# Patient Record
Sex: MALE | Race: WHITE | NOT HISPANIC OR LATINO | Employment: UNEMPLOYED | URBAN - METROPOLITAN AREA
[De-identification: names, ages, dates, MRNs, and addresses within clinical notes are randomized per-mention and may not be internally consistent; named-entity substitution may affect disease eponyms.]

---

## 2017-05-14 ENCOUNTER — OFFICE VISIT (OUTPATIENT)
Dept: URGENT CARE | Facility: URGENT CARE | Age: 27
End: 2017-05-14
Payer: COMMERCIAL

## 2017-05-14 VITALS
HEART RATE: 63 BPM | SYSTOLIC BLOOD PRESSURE: 116 MMHG | RESPIRATION RATE: 16 BRPM | DIASTOLIC BLOOD PRESSURE: 60 MMHG | BODY MASS INDEX: 19.42 KG/M2 | WEIGHT: 142.2 LBS | OXYGEN SATURATION: 98 %

## 2017-05-14 DIAGNOSIS — J45.20 ASTHMA, INTERMITTENT, UNCOMPLICATED: Primary | ICD-10-CM

## 2017-05-14 PROCEDURE — 99213 OFFICE O/P EST LOW 20 MIN: CPT

## 2017-05-14 RX ORDER — ALBUTEROL SULFATE 90 UG/1
2 AEROSOL, METERED RESPIRATORY (INHALATION) EVERY 6 HOURS PRN
Qty: 1 INHALER | Refills: 0 | Status: SHIPPED | OUTPATIENT
Start: 2017-05-14 | End: 2017-07-06

## 2017-05-14 NOTE — MR AVS SNAPSHOT
"              After Visit Summary   2017    Bryson Graves II    MRN: [id number]           Patient Information     Date Of Birth          1990        Visit Information        Provider Department      2017 12:15 PM [place]  PROVIDER [facility]        Today's Diagnoses     Asthma, intermittent, uncomplicated    -  1       Follow-ups after your visit        Who to contact     If you have questions or need follow up information about today's clinic visit or your schedule please contact [facility] directly at [phone].  Normal or non-critical lab and imaging results will be communicated to you by [employer], letter or phone within 4 business days after the clinic has received the results. If you do not hear from us within 7 days, please contact the clinic through [employer] or phone. If you have a critical or abnormal lab result, we will notify you by phone as soon as possible.  Submit refill requests through [employer] or call your pharmacy and they will forward the refill request to us. Please allow 3 business days for your refill to be completed.          Additional Information About Your Visit        MyChart Information     [employer] lets you send messages to your doctor, view your test results, renew your prescriptions, schedule appointments and more. To sign up, go to [email or web address] . Click on \"Log in\" on the left side of the screen, which will take you to the Welcome page. Then click on \"Sign up Now\" on the right side of the page.     You will be asked to enter the access code listed below, as well as some personal information. Please follow the directions to create your username and password.     Your access code is: [email or web address]  Expires: 2017 12:59 PM     Your access code will  in 90 days. If you need help or a new code, please call your [facility] clinic or [phone].        Care EveryWhere ID     This is your Care EveryWhere ID. This " could be used by other organizations to access your [facility] medical records  [id number]        Your Vitals Were     Pulse Respirations Pulse Oximetry BMI (Body Mass Index)          63 16 98% 19.42 kg/m2         Blood Pressure from Last 3 Encounters:   05/14/17 116/60   12/22/16 118/80   09/19/16 118/70    Weight from Last 3 Encounters:   05/14/17 142 lb 3.2 oz (64.5 kg)   12/22/16 147 lb (66.7 kg)   09/19/16 157 lb (71.2 kg)              Today, you had the following     No orders found for display         Today's Medication Changes          These changes are accurate as of: 5/14/17 12:59 PM.  If you have any questions, ask your nurse or doctor.               These medicines have changed or have updated prescriptions.        Dose/Directions    * albuterol 108 (90 BASE) MCG/ACT Inhaler   Commonly known as:  PROAIR HFA/PROVENTIL HFA/VENTOLIN HFA   This may have changed:  Another medication with the same name was added. Make sure you understand how and when to take each.   Changed by:  Naeem Ann MD        Dose:  2 puff   Inhale 2 puffs into the lungs every 6 hours   Refills:  0       * albuterol 108 (90 BASE) MCG/ACT Inhaler   Commonly known as:  PROAIR HFA/PROVENTIL HFA/VENTOLIN HFA   This may have changed:  You were already taking a medication with the same name, and this prescription was added. Make sure you understand how and when to take each.   Used for:  Asthma, intermittent, uncomplicated        Dose:  2 puff   Inhale 2 puffs into the lungs every 6 hours as needed for shortness of breath / dyspnea or wheezing   Quantity:  1 Inhaler   Refills:  0       * Notice:  This list has 2 medication(s) that are the same as other medications prescribed for you. Read the directions carefully, and ask your doctor or other care provider to review them with you.         Where to get your medicines      These [facility] [address] [facility] [place] [facility] [address]  [address]  [place] [address]     Phone:  [phone]     albuterol 108 (90 BASE) MCG/ACT Inhaler                Primary Care Provider Office Phone #    [facility] [phone]       [address] [place] [address]        Thank you!     Thank you for choosing [facility]  for your care. Our goal is always to provide you with excellent care. Hearing back from our patients is one way we can continue to improve our services. Please take a few minutes to complete the written survey that you may receive in the mail after your visit with us. Thank you!             Your Updated Medication List - Protect others around you: Learn how to safely use, store and throw away your medicines at www.disposemymeds.org.          This list is accurate as of: 5/14/17 12:59 PM.  Always use your most recent med list.                   Brand Name Dispense Instructions for use    * albuterol 108 (90 BASE) MCG/ACT Inhaler    PROAIR HFA/PROVENTIL HFA/VENTOLIN HFA     Inhale 2 puffs into the lungs every 6 hours       * albuterol 108 (90 BASE) MCG/ACT Inhaler    PROAIR HFA/PROVENTIL HFA/VENTOLIN HFA    1 Inhaler    Inhale 2 puffs into the lungs every 6 hours as needed for shortness of breath / dyspnea or wheezing       * Notice:  This list has 2 medication(s) that are the same as other medications prescribed for you. Read the directions carefully, and ask your doctor or other care provider to review them with you.

## 2017-05-14 NOTE — PROGRESS NOTES
SUBJECTIVE:    Bryson Graves II is a 26 y.o. Man who present to  today asking for refill on his Albuterol Inhaler.  Patient states he ran out of Albuterol ~ 3 days ago. Denies any acute exacerbation sxs or acute URI sxs.     Patient states he has long-standing, intermittent, asthma that requires PRN use of Albuterol (typically 1-2x/week at maximum). No night-time asthma sxs. No ER visits or IP Hosp tx for asthma in lifetime.     I did personally complete an ACT evaluation with him today (score 23) on file in Epic.        OBJECTIVE:  /60 (BP Location: Right arm, Patient Position: Chair, Cuff Size: Adult Regular)  Pulse 63  Resp 16  Wt 142 lb 3.2 oz (64.5 kg)  SpO2 98%  BMI 19.42 kg/m2      General appearance: alert and no apparent distress  Skin color is pink and without rash.  HEENT:   Conjunctiva not injected.  Sclera clear.  Left TM is normal: no effusions, no erythema, and normal landmarks.  Right TM is normal: no effusions, no erythema, and normal landmarks.  Nasal mucosa is normal.  Oropharyngeal exam is normal: no lesions, erythema, adenopathy or exudate.  Neck is supple with no adenopathy  CARDIAC:NORMAL - regular rate and rhythm without murmur.  RESP: Normal - CTA without rales, rhonchi, or wheezing.      ASSESSMENT/PLAN:    (J45.20) Asthma, intermittent, uncomplicated  (primary encounter diagnosis)  Plan: albuterol (PROAIR HFA/PROVENTIL HFA/VENTOLIN         HFA) 108 (90 BASE) MCG/ACT Inhaler    Patient educated we do not manage chronic conditions in . He is  educated he will need to follow-up with PCP for refills and for all future asthma management. He verbalizes understanding of and agrees to the above plan.

## 2017-05-14 NOTE — NURSING NOTE
"Chief Complaint   Patient presents with     Urgent Care     Refill Request     pt requesting refill on albuterol inhaler.  pt reports slight difficult in breathing today.       Initial /60 (BP Location: Right arm, Patient Position: Chair, Cuff Size: Adult Regular)  Pulse 63  Resp 16  Wt 142 lb 3.2 oz (64.5 kg)  SpO2 98%  BMI 19.42 kg/m2 Estimated body mass index is 19.42 kg/(m^2) as calculated from the following:    Height as of 12/22/16: 5' 11.75\" (1.822 m).    Weight as of this encounter: 142 lb 3.2 oz (64.5 kg).  Medication Reconciliation: complete      Ruth Ann Gonzalez CMA                                5/14/2017 12:38 PM     "

## 2017-05-15 ASSESSMENT — ASTHMA QUESTIONNAIRES: ACT_TOTALSCORE: 23

## 2017-07-06 ENCOUNTER — OFFICE VISIT (OUTPATIENT)
Dept: URGENT CARE | Facility: URGENT CARE | Age: 27
End: 2017-07-06
Payer: COMMERCIAL

## 2017-07-06 VITALS
DIASTOLIC BLOOD PRESSURE: 70 MMHG | OXYGEN SATURATION: 100 % | BODY MASS INDEX: 19.39 KG/M2 | SYSTOLIC BLOOD PRESSURE: 116 MMHG | HEART RATE: 56 BPM | RESPIRATION RATE: 16 BRPM | WEIGHT: 142 LBS | TEMPERATURE: 97.3 F

## 2017-07-06 DIAGNOSIS — Z91.010 PEANUT ALLERGY: Primary | ICD-10-CM

## 2017-07-06 DIAGNOSIS — T78.40XA ALLERGIC REACTION, INITIAL ENCOUNTER: ICD-10-CM

## 2017-07-06 PROCEDURE — 99213 OFFICE O/P EST LOW 20 MIN: CPT | Performed by: PHYSICIAN ASSISTANT

## 2017-07-06 RX ORDER — EPINEPHRINE 0.3 MG/.3ML
0.3 INJECTION SUBCUTANEOUS
Qty: 0.6 ML | Refills: 0 | Status: SHIPPED | OUTPATIENT
Start: 2017-07-06

## 2017-07-06 RX ORDER — DIPHENHYDRAMINE HCL 25 MG
50 TABLET ORAL EVERY 6 HOURS PRN
Qty: 2 TABLET | Refills: 0
Start: 2017-07-06 | End: 2017-07-07

## 2017-07-06 RX ORDER — EPINEPHRINE 0.3 MG/.3ML
0.3 INJECTION SUBCUTANEOUS
COMMUNITY
Start: 2017-04-07

## 2017-07-06 NOTE — PROGRESS NOTES
"SUBJECTIVE:    Bryson Graves II is a 26 y.o. Man, with a pmhx of anaphylactic reaction to peanuts, who presents to  today for evaluation of accident peanut exposure approximately 12:20 to 12:30 pm today (apprxox 60-70 min ago). Patient tells me he took a sip of his friend's smoothie that contained peanuts.  Patient states, \"My lips started tingling right away and I spit everything out.\"  He does not think he swallowed any of the smoothie.  He has not taken any Benadryl or other medication post-exposure.     Upon questioning, patient states he has an Epi-Pen but estimates it is \"about 2 years old\".  He does not have easy access to his Epi-Pen at this time--states it is at his parents home [place] (I believe he stated in [place]).  Patient is living in [place].     ROS:     HEENT: Denies any swelling of the lips. Denies any mouth, throat or tongue swelling.    RESP:Denies any wheezing or SOB. Positive pmhx of asthma. Has not needed to use his Albuterol since peanut exposure.  CARDIAC: Denies any heart pounding or racin   GI: Denies any N/V or abdomina pain   SKIN: Denies any rash or reddening of skin         OBJECTIVE:  /70 (BP Location: Right arm, Patient Position: Chair, Cuff Size: Adult Regular)  Pulse 56  Temp 97.3  F (36.3  C) (Tympanic)  Resp 16  Wt 142 lb (64.4 kg)  SpO2 100%  BMI 19.39 kg/m2    General appearance: alert and no apparent distress  Skin color is pink and without rash.  HEENT:   Conjunctiva not injected.  Sclera clear.  Left TM is normal: no effusions, no erythema, and normal landmarks.  Right TM is normal: no effusions, no erythema, and normal landmarks.  Nasal mucosa is normal.  Oropharyngeal exam is normal: no lesions, erythema, adenopathy or exudate. Specifically no swelling of mouth, tongue, posterior pharynx or soft palate.   Neck is supple. FROM. Trachea mid-line. No adenopathy  CARDIAC:NORMAL - regular rate and rhythm without murmur.  RESP: " "Normal - CTA without rales, rhonchi, or wheezing.  ABDOMEN: Abdomen soft, non-tender. BS normal. No masses, organomegaly      ASSESSMENT/PLAN:    (Z91.010) Peanut allergy  (primary encounter diagnosis)  Comment: Brief episode of \"tingling lips\". Patient spit out smoothie/did not swallow. No hypersensitivity reaction sxs post-exposure (now over one hour ago). Normal VS. Very reassuring exam.   Plan: EPINEPHrine (EPIPEN 2-STORMY) 0.3 MG/0.3ML         injection, diphenhydrAMINE (BENADRYL) 25 MG         tablet    1. For additional precaution, given past hx of anaphylaxis and hx of asthma, Benadryl 50 mg PO given here.   2. Epi-Pen rx given and patient will fill here prior to leaving clinic. Advised to check the Epi-Pen at parents home as it is likely to be    3.  In addition to the above, allergic reaction and indication for use of Epi-Pen \"red flag\" signs and sxs are reviewed with pt in detail today.      Pt verbalizes understanding of and agrees to the above plan.       (T78.40XA) Allergic reaction, initial encounter  Plan: EPINEPHrine (EPIPEN 2-STORMY) 0.3 MG/0.3ML         injection, diphenhydrAMINE (BENADRYL) 25 MG         tablet  As per above       "

## 2017-07-06 NOTE — NURSING NOTE
"Chief Complaint   Patient presents with     Urgent Care     consumed peanut small amount of peanut butter on accident, currently not having any symptoms but has know allergy       Initial /70 (BP Location: Right arm, Patient Position: Chair, Cuff Size: Adult Regular)  Pulse 56  Temp 97.3  F (36.3  C) (Tympanic)  Resp 16  Wt 142 lb (64.4 kg)  SpO2 100%  BMI 19.39 kg/m2 Estimated body mass index is 19.39 kg/(m^2) as calculated from the following:    Height as of 12/22/16: 5' 11.75\" (1.822 m).    Weight as of this encounter: 142 lb (64.4 kg).  Medication Reconciliation: complete.Daniel GARY MA      "

## 2017-07-30 ENCOUNTER — HOSPITAL ENCOUNTER (EMERGENCY)
Facility: CLINIC | Age: 27
Discharge: HOME | End: 2017-07-30
Attending: EMERGENCY MEDICINE | Admitting: EMERGENCY MEDICINE
Payer: COMMERCIAL

## 2017-07-30 ENCOUNTER — APPOINTMENT (OUTPATIENT)
Dept: GENERAL RADIOLOGY | Facility: CLINIC | Age: 27
End: 2017-07-30
Attending: EMERGENCY MEDICINE
Payer: COMMERCIAL

## 2017-07-30 VITALS
SYSTOLIC BLOOD PRESSURE: 133 MMHG | RESPIRATION RATE: 16 BRPM | TEMPERATURE: 98.4 F | HEART RATE: 65 BPM | DIASTOLIC BLOOD PRESSURE: 85 MMHG | OXYGEN SATURATION: 100 %

## 2017-07-30 DIAGNOSIS — S60.221A CONTUSION OF RIGHT HAND, INITIAL ENCOUNTER: ICD-10-CM

## 2017-07-30 PROCEDURE — 99283 EMERGENCY DEPT VISIT LOW MDM: CPT

## 2017-07-30 PROCEDURE — 73130 X-RAY EXAM OF HAND: CPT | Mod: RT

## 2017-07-30 NOTE — ED AVS SNAPSHOT
[facility] Emergency Department    [address] [place] [address]    Phone:  [phone]    Fax:  [phone]                                       Bryson Graves II   MRN: [id number]    Department:  [facility] Emergency Department   Date of Visit:  7/30/2017           Patient Information     Date Of Birth          1990        Your diagnoses for this visit were:     Contusion of right hand, initial encounter        You were seen by Miguel Pryor MD.        Discharge Instructions       Please make an appointment to follow up with your primary care provider in 5-7 days if not improving.        Hand Contusion  You have a contusion. This is also called a bruise. There is swelling and some bleeding under the skin, but no broken bones. This injury generally takes a few days to a few weeks to heal.  During that time, the bruise will typically change in color from reddish, to purple-blue, to greenish-yellow, then to yellow-brown.  Home care    Elevate the hand to reduce pain and swelling. As much as possible, sit or lie down with the hand raised about the level of your heart. This is especially important during the first 48 hours.    Ice the hand to help reduce pain and swelling. Wrap a cold source (ice pack or ice cubes in a plastic bag) in a thin towel. Apply to the bruised area for 20 minutes every 1 to 2 hours the first day. Continue this 3 to 4 times a day until the pain and swelling goes away.    Unless another medicine was prescribed, you can take acetaminophen, ibuprofen, or naproxen to control pain. (If you have chronic liver or kidney disease or ever had a stomach ulcer or gastrointestinal bleeding, talk with your doctor before using these medicines.)  Follow up  Follow up with your healthcare provider or our staff as advised. Call if you are not improving within 1 to 2 weeks.  When to seek medical advice   Call your healthcare provider  right away if you have any of the following:    Increased pain or swelling    Arm becomes cold, blue, numb or tingly    Signs of infection: Warmth, drainage, or increased redness or pain around the bruise    Inability to move the injured hand     Frequent bruising for unknown reasons  Date Last Reviewed: 2/1/2017 2000-2017 [employer]. [address] [place] [address]. All rights reserved. This information is not intended as a substitute for professional medical care. Always follow your healthcare professional's instructions.          24 Hour Appointment Hotline       To make an appointment at any [facility], call [phone] ([phone]). If you don't have a family doctor or clinic, we will help you find one. [facility] clinics are conveniently located to serve the needs of you and your family.             Review of your medicines      Our records show that you are taking the medicines listed below. If these are incorrect, please call your family doctor or clinic.        Dose / Directions Last dose taken    albuterol 108 (90 BASE) MCG/ACT Inhaler   Commonly known as:  PROAIR HFA/PROVENTIL HFA/VENTOLIN HFA   Dose:  2 puff        Inhale 2 puffs into the lungs every 6 hours   Refills:  0        * EPINEPHrine 0.3 MG/0.3ML injection 2-pack   Commonly known as:  EPIPEN/ADRENACLICK/or ANY BX GENERIC EQUIV   Dose:  0.3 mg        Inject 0.3 mg into the muscle   Refills:  0        * EPINEPHrine 0.3 MG/0.3ML injection 2-pack   Commonly known as:  EPIPEN 2-STORMY   Dose:  0.3 mg   Quantity:  0.6 mL        Inject 0.3 mLs (0.3 mg) into the muscle once as needed for anaphylaxis   Refills:  0        * Notice:  This list has 2 medication(s) that are the same as other medications prescribed for you. Read the directions carefully, and ask your doctor or other care provider to review them with you.            Procedures and tests performed during your visit     Hand XR, G/E 3 views, right      Orders  Needing Specimen Collection     None      Pending Results     No orders found from 7/28/2017 to 7/31/2017.            Pending Culture Results     No orders found from 7/28/2017 to 7/31/2017.            Pending Results Instructions     If you had any lab results that were not finalized at the time of your Discharge, you can call the ED Lab Result RN at [phone]. You will be contacted by this team for any positive Lab results or changes in treatment. The nurses are available 7 days a week from 10A to 6:30P.  You can leave a message 24 hours per day and they will return your call.        Test Results From Your Hospital Stay        7/30/2017 11:27 PM      Narrative     RIGHT HAND 3 VIEWS   7/30/2017 11:21 PM     HISTORY: Patient punched wall. Swelling to base of right 5th finger.    COMPARISON: None.        Impression     IMPRESSION: No evidence for acute fracture or dislocation in the right  hand. Postoperative changes of plate and screw fixation of the right  fifth metacarpal are noted.    CLARENCE HARMAN MD                Clinical Quality Measure: Blood Pressure Screening     Your blood pressure was checked while you were in the emergency department today. The last reading we obtained was  BP: 134/83 . Please read the guidelines below about what these numbers mean and what you should do about them.  If your systolic blood pressure (the top number) is less than 120 and your diastolic blood pressure (the bottom number) is less than 80, then your blood pressure is normal. There is nothing more that you need to do about it.  If your systolic blood pressure (the top number) is 120-139 or your diastolic blood pressure (the bottom number) is 80-89, your blood pressure may be higher than it should be. You should have your blood pressure rechecked within a year by a primary care provider.  If your systolic blood pressure (the top number) is 140 or greater or your diastolic blood pressure (the bottom number) is 90 or  "greater, you may have high blood pressure. High blood pressure is treatable, but if left untreated over time it can put you at risk for heart attack, stroke, or kidney failure. You should have your blood pressure rechecked by a primary care provider within the next 4 weeks.  If your provider in the emergency department today gave you specific instructions to follow-up with your doctor or provider even sooner than that, you should follow that instruction and not wait for up to 4 weeks for your follow-up visit.        Thank you for choosing [facility]       Thank you for choosing [facility] for your care. Our goal is always to provide you with excellent care. Hearing back from our patients is one way we can continue to improve our services. Please take a few minutes to complete the written survey that you may receive in the mail after you visit with us. Thank you!        [employer] Information     [employer] lets you send messages to your doctor, view your test results, renew your prescriptions, schedule appointments and more. To sign up, go to [email or web address] . Click on \"Log in\" on the left side of the screen, which will take you to the Welcome page. Then click on \"Sign up Now\" on the right side of the page.     You will be asked to enter the access code listed below, as well as some personal information. Please follow the directions to create your username and password.     Your access code is: [email or web address]  Expires: 2017 12:59 PM     Your access code will  in 90 days. If you need help or a new code, please call your [facility] clinic or [phone].        Care EveryWhere ID     This is your Care EveryWhere ID. This could be used by other organizations to access your [facility] medical records  [id number]        Equal Access to Services     SAUL RAZA : theo Jenkins, alexandra mueller. So [facility] [phone].    ATENCIÓN: " Si habla tara, tiene a simmons disposición servicios gratuitos de asistencia lingüística. Llame al [phone].    We comply with applicable federal civil rights laws and [place] laws. We do not discriminate on the basis of race, color, national origin, age, disability sex, sexual orientation or gender identity.            After Visit Summary       This is your record. Keep this with you and show to your community pharmacist(s) and doctor(s) at your next visit.

## 2017-07-30 NOTE — ED AVS SNAPSHOT
[facility] Emergency Department    [address]    [facility] [address]    Phone:  [phone]    Fax:  [phone]                                       Bryson Graves II   MRN: [id number]    Department:  [facility] Emergency Department   Date of Visit:  7/30/2017           After Visit Summary Signature Page     I have received my discharge instructions, and my questions have been answered. I have discussed any challenges I see with this plan with the nurse or doctor.    ..........................................................................................................................................  Patient/Patient Representative Signature      ..........................................................................................................................................  Patient Representative Print Name and Relationship to Patient    ..................................................               ................................................  Date                                            Time    ..........................................................................................................................................  Reviewed by Signature/Title    ...................................................              ..............................................  Date                                                            Time

## 2017-07-31 RX ORDER — PROPOFOL 10 MG/ML
INJECTION, EMULSION INTRAVENOUS
Status: DISCONTINUED
Start: 2017-07-31 | End: 2017-07-31 | Stop reason: HOSPADM

## 2017-07-31 NOTE — ED PROVIDER NOTES
History     Chief Complaint:  Hand Injury    HPI   Bryson Graves II is a right handed 27 year old male who presents with hand injury. The patient states he had became frustrated at work prompting him to hit a countertop. He endorses pain in his right hand knuckles. He did not take any medications for his pain and does not have any wrist or thumb pain. He notes that he has a plate on his right fifth metatarsal due to a fracture 8 years ago.     Allergies:  Covelo Flavor  Nuts      Medications:    Epipen  Albuterol    Problem List:    Asthma     Past Medical History:    The patient denies any significant past medical history.     Past Surgical History:    The patient does not have any pertinent past surgical history.     Family History:    No past pertinent family history.     Social History:  Negative for alcohol use.   Negative for tobacco use.   Marital Status:  Single [1]     Review of Systems   Musculoskeletal:        Right hand pain   All other systems reviewed and are negative.      Physical Exam   First Vitals:  BP: 134/83  Pulse: 65  Temp: 98.4  F (36.9  C)  Resp: 18  SpO2: 99 %      Physical Exam   HENT:   Right Ear: External ear normal.   Left Ear: External ear normal.   Nose: Nose normal.   Eyes: Right eye exhibits no discharge. Left eye exhibits no discharge. No scleral icterus.   Cardiovascular: Intact distal pulses.    Pulmonary/Chest: Effort normal.   Speaking in full sentences   Musculoskeletal:   + ttp pip index finger, no palpable deformity, fds/fdp/ext tendon intact    + ttp and swelling distal 5th MCP, rom intact, no palpable deformity, distal cms intact   Neurological:   Alert    No gross motor or sensory deficits   Skin: Skin is warm.   Psychiatric: He has a normal mood and affect. Judgment normal.   Nursing note and vitals reviewed.        Emergency Department Course     Imaging:  Radiographic findings were communicated with the patient who voiced understanding of the  findings.  XR Hand, Right, G/E 3 views:  No evidence for acute fracture or dislocation in the right  hand. Postoperative changes of plate and screw fixation of the right  fifth metacarpal are noted. As per radiology.     Emergency Department Course:  Nursing notes and vitals reviewed. I performed an exam of the patient as documented above.     The patient was sent for a hand XR while in the emergency department, findings above.     2330 I rechecked the patient and discussed the results of their workup thus far.     Findings and plan explained to the patient. Patient discharged home with instructions regarding supportive care, medications, and reasons to return. The importance of close follow-up was reviewed.      Impression & Plan      Medical Decision Making:  Bryson Graves II is a 27 year old male, right hand dominant, here with a right hand injury after punching a counter top. He has a previous boxer's fracture. XR here shows no fracture or failure of his hardware. He was given splint for comfort and discharged home.       Diagnosis:    ICD-10-CM   1. Contusion of right hand, initial encounter S60.221A       Disposition:  discharged to home    IRosa, am serving as a scribe on 7/30/2017 at 11:31 PM to personally document services performed by Miguel Pryor MD based on my observations and the provider's statements to me.      Rosa Kowalski  7/30/2017   [facility] EMERGENCY DEPARTMENT       Miguel Pryor MD  07/31/17 0148

## 2017-09-21 ENCOUNTER — OFFICE VISIT (OUTPATIENT)
Dept: URGENT CARE | Facility: URGENT CARE | Age: 27
End: 2017-09-21
Payer: COMMERCIAL

## 2017-09-21 VITALS
BODY MASS INDEX: 20.19 KG/M2 | SYSTOLIC BLOOD PRESSURE: 118 MMHG | HEART RATE: 75 BPM | DIASTOLIC BLOOD PRESSURE: 60 MMHG | OXYGEN SATURATION: 98 % | WEIGHT: 147.8 LBS | TEMPERATURE: 97.5 F

## 2017-09-21 DIAGNOSIS — J45.20 ASTHMA, INTERMITTENT, UNCOMPLICATED: Primary | ICD-10-CM

## 2017-09-21 PROCEDURE — 99213 OFFICE O/P EST LOW 20 MIN: CPT | Performed by: PHYSICIAN ASSISTANT

## 2017-09-21 RX ORDER — ALBUTEROL SULFATE 90 UG/1
2 AEROSOL, METERED RESPIRATORY (INHALATION) EVERY 6 HOURS PRN
Qty: 1 INHALER | Refills: 0 | Status: SHIPPED | OUTPATIENT
Start: 2017-09-21

## 2017-09-21 NOTE — MR AVS SNAPSHOT
After Visit Summary   9/21/2017    Bryson Graves II    MRN: [id number]           Patient Information     Date Of Birth          1990        Visit Information        Provider Department      9/21/2017 4:05 PM Arnold Cuenca PA-C [facility]        Today's Diagnoses     Asthma, intermittent, uncomplicated    -  1      Care Instructions      Understanding Asthma    Asthma causes swelling and narrowing of the airways in your lungs. Medical experts are not exactly sure what causes asthma. It is believed to be caused by a mix of inherited and environmental factors.  Healthy lungs  Inside the lungs there are branching airways made of stretchy tissue. Each airway is wrapped with bands of muscle. The airways become more narrow as they go deeper into the lungs. The smallest airways end in clusters of tiny balloon-like air sacs (alveoli). These clusters are surrounded by blood vessels. When you breathe in (inhale), air enters the lungs. It travels down through the airways until it reaches the air sacs. When you breathe out (exhale), air travels up through the airways and out of the lungs. The airways produce mucus that traps particles you breathe in. Normally, the mucus is then swept out of the lungs by tiny hairs (cilia) that line the airways. The mucus is swallowed or coughed up.  What the lungs do  The air you inhale contains oxygen. When oxygen reaches the air sacs, it passes into the blood vessels surrounding the sacs. Your blood then delivers oxygen to all of your cells. As you exhale, carbon dioxide is removed in a similar way from the blood in the air sacs, and from the body.  When you have asthma  People with asthma have very sensitive airways. This means the airways react to certain things called triggers (such as pollen, dust, or smoke) and become swollen and narrowed. Inflammation makes the airways swollen and narrowed. This is a long-lasting  (chronic) problem. The airways may not always be narrowed enough to notice breathing problems.  Symptoms of chronic inflammation:     Coughing    Chest tightness    Shortness of breath    Wheezing (a whistling noise, especially when breathing out)    Low energy or feeling tired  In some people, over time chronic mild inflammation can lead to lasting (permanent) scarring of airways and loss of lung function.  Moderate flare-ups  When sensitive airways are irritated by a trigger, the muscles around the airways tighten. The lining of the airways swells. Thick, sticky mucus increases and partly clogs the airways. All of this makes you work harder to keep breathing.  Symptoms of moderate flare-ups:    Coughing, especially at night    Getting tired or out of breath easily    Wheezing    Chest tightness    Faster breathing when at rest  Severe flare-ups  Severe flare-ups are life-threatening. In a severe flare-up, the muscle tightening, swelling, and mucus production are even worse. It s very hard to breathe. Your body can't get enough oxygen and can't remove carbon dioxide. Waste gas is trapped in the alveoli, and gas exchange can t occur. The body is not getting enough oxygen. Without oxygen, body tissues, especially brain tissue, begin to get damaged. If this goes on for long, it can lead to severe brain damage or death.  Call 911 (or have someone call for you) if you have any of these symptoms and they are not relieved right away by taking your quick-relief medicine as prescribed:    Severe trouble breathing    Too short of breath to talk or walk    Lips or fingers turning blue    Feeling lightheaded or dizzy, as though you are about to pass out    Peak flow less than 50% of your personal best, if you use peak flow monitoring  Asthma is a long-term condition. So it s important to work with your healthcare provider to manage it. If you smoke, get help to quit. Know your triggers and figure out how to avoid them. It s  also very important to take your medicines as directed. That means taking them even when you feel good.  Date Last Reviewed: 12/1/2016 2000-2017 [employer]. [address] [place] [address], [place] [address]. All rights reserved. This information is not intended as a substitute for professional medical care. Always follow your healthcare professional's instructions.        Caring for Your Inhaler  Your healthcare provider may prescribe medicine that you breathe in using a metered-dose inhaler. It is important to keep it clean. You should also keep track of how much medicine is left in the canister so you ll never run out.    Keeping your inhaler clean    Take off the canister, the part with the medicine, and cap from the mouthpiece.    Don't wash the canister or put it in water.    Run warm water through the mouthpiece for about a minute.    Shake off the water and let it air-dry.    If you need to use it before it is dry, shake off any water and replace the canister. Test spray it away from you to make sure it works.    If you use a spacer, clean it with warm water and a small amount of mild dish soap. Do this once every week or two.    Make sure you check the package insert for special instructions. The insert is the information that comes with the medicine. It may tell you how to take care of and clean your spacer.  When to replace your inhaler  Each inhaler is good for only a certain number of puffs of medicine. After those puffs are used up, any puffs left will not give you the amount of medicine you need. To be sure you ll get enough medicine when you need it, keep track of how many puffs you use. Here s an easy way to keep track of the medicine in your inhaler:  1. Find the number on the mouthpiece that tells you how many puffs it contains. Some inhalers have the counter on the mouthpiece instead of the canister. Keep the canister and mouthpiece together so you can keep track of how many puffs are  left.  2. Divide this number by how many puffs you are told to use in one day. This gives you the number of days your medicine should last.  3. Use your calendar to find out what date your medicine will run out. Anjel it on the canister and on your calendar.  Be sure to get a refill of your medicines before you run out. Some inhalers have dose counters to track the amount of medicine used.  For example, if your new canister holds 200 puffs and you ve been told to use 4 puffs a day:  200 ÷ 4 = 50 days  Sample for you to fill in:     ____________  Number of puffs in new canister ÷    ____________  Number of puffs you use each day =    ____________  Number of days medicine will last   Note: Remember that your medicine will not last long if you use your inhaler more often than planned.   Date Last Reviewed: 10/1/2016    5292-0790 [employer]. [address] [place] [address]. All rights reserved. This information is not intended as a substitute for professional medical care. Always follow your healthcare professional's instructions.                Follow-ups after your visit        Who to contact     If you have questions or need follow up information about today's clinic visit or your schedule please contact [facility] directly at [phone].  Normal or non-critical lab and imaging results will be communicated to you by Swiftypehart, letter or phone within 4 business days after the clinic has received the results. If you do not hear from us within 7 days, please contact the clinic through [employer] or phone. If you have a critical or abnormal lab result, we will notify you by phone as soon as possible.  Submit refill requests through [employer] or call your pharmacy and they will forward the refill request to us. Please allow 3 business days for your refill to be completed.          Additional Information About Your Visit        [employer] Information     [employer] lets you send messages to  "your doctor, view your test results, renew your prescriptions, schedule appointments and more. To sign up, go to [email or web address] . Click on \"Log in\" on the left side of the screen, which will take you to the Welcome page. Then click on \"Sign up Now\" on the right side of the page.     You will be asked to enter the access code listed below, as well as some personal information. Please follow the directions to create your username and password.     Your access code is: [email or web address]  Expires: 2017  4:29 PM     Your access code will  in 90 days. If you need help or a new code, please call your [place] clinic or [phone].        Care EveryWhere ID     This is your Care EveryWhere ID. This could be used by other organizations to access your [place] medical records  [id number]        Your Vitals Were     Pulse Temperature Pulse Oximetry BMI (Body Mass Index)          75 97.5  F (36.4  C) (Tympanic) 98% 20.19 kg/m2         Blood Pressure from Last 3 Encounters:   17 118/60   17 133/85   17 116/70    Weight from Last 3 Encounters:   17 147 lb 12.8 oz (67 kg)   17 142 lb (64.4 kg)   17 142 lb 3.2 oz (64.5 kg)              Today, you had the following     No orders found for display         Today's Medication Changes          These changes are accurate as of: 17  4:30 PM.  If you have any questions, ask your nurse or doctor.               These medicines have changed or have updated prescriptions.        Dose/Directions    * albuterol 108 (90 BASE) MCG/ACT Inhaler   Commonly known as:  PROAIR HFA/PROVENTIL HFA/VENTOLIN HFA   This may have changed:  Another medication with the same name was added. Make sure you understand how and when to take each.   Changed by:  Naeem Ann MD        Dose:  2 puff   Inhale 2 puffs into the lungs every 6 hours   Refills:  0       * albuterol 108 (90 BASE) MCG/ACT Inhaler   Commonly known as:  PROAIR HFA/PROVENTIL " HFA/VENTOLIN HFA   This may have changed:  You were already taking a medication with the same name, and this prescription was added. Make sure you understand how and when to take each.   Used for:  Asthma, intermittent, uncomplicated   Changed by:  Arnold Cuenca PA-C        Dose:  2 puff   Inhale 2 puffs into the lungs every 6 hours as needed for shortness of breath / dyspnea or wheezing   Quantity:  1 Inhaler   Refills:  0       * Notice:  This list has 2 medication(s) that are the same as other medications prescribed for you. Read the directions carefully, and ask your doctor or other care provider to review them with you.         Where to get your medicines      These medications were sent to [facility] - [address] [facility]   [address] [facility] [facility]  [address], [place] [address]     Phone:  [phone]     albuterol 108 (90 BASE) MCG/ACT Inhaler                Primary Care Provider Office Phone #    [facility] [phone] [address] [facility] [address]        Equal Access to Services     [facility]: Hadii aad ku hadasho Soomaali, waaxda luqadaha, qaybta kaalmada adeegyada, waxay idiin haydougn julio granados . So [facility] [phone].    ATENCIÓN: Si habla español, tiene a simmons disposición servicios gratuitos de asistencia lingüística. Llame al [phone].    We comply with applicable federal civil rights laws and [place] laws. We do not discriminate on the basis of race, color, national origin, age, disability sex, sexual orientation or gender identity.            Thank you!     Thank you for choosing [facility]  for your care. Our goal is always to provide you with excellent care. Hearing back from our patients is one way we can continue to improve our services. Please take a few minutes to complete the written survey that you may receive in the mail after your visit with us. Thank  you!             Your Updated Medication List - Protect others around you: Learn how to safely use, store and throw away your medicines at www.disposemymeds.org.          This list is accurate as of: 9/21/17  4:30 PM.  Always use your most recent med list.                   Brand Name Dispense Instructions for use Diagnosis    * albuterol 108 (90 BASE) MCG/ACT Inhaler    PROAIR HFA/PROVENTIL HFA/VENTOLIN HFA     Inhale 2 puffs into the lungs every 6 hours        * albuterol 108 (90 BASE) MCG/ACT Inhaler    PROAIR HFA/PROVENTIL HFA/VENTOLIN HFA    1 Inhaler    Inhale 2 puffs into the lungs every 6 hours as needed for shortness of breath / dyspnea or wheezing    Asthma, intermittent, uncomplicated       * EPINEPHrine 0.3 MG/0.3ML injection 2-pack    EPIPEN/ADRENACLICK/or ANY BX GENERIC EQUIV     Inject 0.3 mg into the muscle        * EPINEPHrine 0.3 MG/0.3ML injection 2-pack    EPIPEN 2-STORMY    0.6 mL    Inject 0.3 mLs (0.3 mg) into the muscle once as needed for anaphylaxis    Peanut allergy, Allergic reaction, initial encounter       * Notice:  This list has 4 medication(s) that are the same as other medications prescribed for you. Read the directions carefully, and ask your doctor or other care provider to review them with you.

## 2017-09-21 NOTE — NURSING NOTE
"Chief Complaint   Patient presents with     Urgent Care     Asthma     Pt states lost inhaler and needs new one.        Initial /60 (BP Location: Right arm, Patient Position: Chair, Cuff Size: Adult Regular)  Pulse 75  Temp 97.5  F (36.4  C) (Tympanic)  Wt 147 lb 12.8 oz (67 kg)  SpO2 98%  BMI 20.19 kg/m2 Estimated body mass index is 20.19 kg/(m^2) as calculated from the following:    Height as of 12/22/16: 5' 11.75\" (1.822 m).    Weight as of this encounter: 147 lb 12.8 oz (67 kg).  Medication Reconciliation: unable or not appropriate to perform   Emmie Thomas CMA ([facility]) 9/21/2017 4:15 PM    "

## 2017-09-21 NOTE — PATIENT INSTRUCTIONS
Understanding Asthma    Asthma causes swelling and narrowing of the airways in your lungs. Medical experts are not exactly sure what causes asthma. It is believed to be caused by a mix of inherited and environmental factors.  Healthy lungs  Inside the lungs there are branching airways made of stretchy tissue. Each airway is wrapped with bands of muscle. The airways become more narrow as they go deeper into the lungs. The smallest airways end in clusters of tiny balloon-like air sacs (alveoli). These clusters are surrounded by blood vessels. When you breathe in (inhale), air enters the lungs. It travels down through the airways until it reaches the air sacs. When you breathe out (exhale), air travels up through the airways and out of the lungs. The airways produce mucus that traps particles you breathe in. Normally, the mucus is then swept out of the lungs by tiny hairs (cilia) that line the airways. The mucus is swallowed or coughed up.  What the lungs do  The air you inhale contains oxygen. When oxygen reaches the air sacs, it passes into the blood vessels surrounding the sacs. Your blood then delivers oxygen to all of your cells. As you exhale, carbon dioxide is removed in a similar way from the blood in the air sacs, and from the body.  When you have asthma  People with asthma have very sensitive airways. This means the airways react to certain things called triggers (such as pollen, dust, or smoke) and become swollen and narrowed. Inflammation makes the airways swollen and narrowed. This is a long-lasting (chronic) problem. The airways may not always be narrowed enough to notice breathing problems.  Symptoms of chronic inflammation:     Coughing    Chest tightness    Shortness of breath    Wheezing (a whistling noise, especially when breathing out)    Low energy or feeling tired  In some people, over time chronic mild inflammation can lead to lasting (permanent) scarring of airways and loss of lung  function.  Moderate flare-ups  When sensitive airways are irritated by a trigger, the muscles around the airways tighten. The lining of the airways swells. Thick, sticky mucus increases and partly clogs the airways. All of this makes you work harder to keep breathing.  Symptoms of moderate flare-ups:    Coughing, especially at night    Getting tired or out of breath easily    Wheezing    Chest tightness    Faster breathing when at rest  Severe flare-ups  Severe flare-ups are life-threatening. In a severe flare-up, the muscle tightening, swelling, and mucus production are even worse. It s very hard to breathe. Your body can't get enough oxygen and can't remove carbon dioxide. Waste gas is trapped in the alveoli, and gas exchange can t occur. The body is not getting enough oxygen. Without oxygen, body tissues, especially brain tissue, begin to get damaged. If this goes on for long, it can lead to severe brain damage or death.  Call 911 (or have someone call for you) if you have any of these symptoms and they are not relieved right away by taking your quick-relief medicine as prescribed:    Severe trouble breathing    Too short of breath to talk or walk    Lips or fingers turning blue    Feeling lightheaded or dizzy, as though you are about to pass out    Peak flow less than 50% of your personal best, if you use peak flow monitoring  Asthma is a long-term condition. So it s important to work with your healthcare provider to manage it. If you smoke, get help to quit. Know your triggers and figure out how to avoid them. It s also very important to take your medicines as directed. That means taking them even when you feel good.  Date Last Reviewed: 12/1/2016 2000-2017 [employer]. [address] [place] [address]. All rights reserved. This information is not intended as a substitute for professional medical care. Always follow your healthcare professional's instructions.        Caring for Your  Inhaler  Your healthcare provider may prescribe medicine that you breathe in using a metered-dose inhaler. It is important to keep it clean. You should also keep track of how much medicine is left in the canister so you ll never run out.    Keeping your inhaler clean    Take off the canister, the part with the medicine, and cap from the mouthpiece.    Don't wash the canister or put it in water.    Run warm water through the mouthpiece for about a minute.    Shake off the water and let it air-dry.    If you need to use it before it is dry, shake off any water and replace the canister. Test spray it away from you to make sure it works.    If you use a spacer, clean it with warm water and a small amount of mild dish soap. Do this once every week or two.    Make sure you check the package insert for special instructions. The insert is the information that comes with the medicine. It may tell you how to take care of and clean your spacer.  When to replace your inhaler  Each inhaler is good for only a certain number of puffs of medicine. After those puffs are used up, any puffs left will not give you the amount of medicine you need. To be sure you ll get enough medicine when you need it, keep track of how many puffs you use. Here s an easy way to keep track of the medicine in your inhaler:  1. Find the number on the mouthpiece that tells you how many puffs it contains. Some inhalers have the counter on the mouthpiece instead of the canister. Keep the canister and mouthpiece together so you can keep track of how many puffs are left.  2. Divide this number by how many puffs you are told to use in one day. This gives you the number of days your medicine should last.  3. Use your calendar to find out what date your medicine will run out. Anjel it on the canister and on your calendar.  Be sure to get a refill of your medicines before you run out. Some inhalers have dose counters to track the amount of medicine used.  For  example, if your new canister holds 200 puffs and you ve been told to use 4 puffs a day:  200 ÷ 4 = 50 days  Sample for you to fill in:     ____________  Number of puffs in new canister ÷    ____________  Number of puffs you use each day =    ____________  Number of days medicine will last   Note: Remember that your medicine will not last long if you use your inhaler more often than planned.   Date Last Reviewed: 10/1/2016    5296-8902 [employer]. [address] [place] [address], [place] [address]. All rights reserved. This information is not intended as a substitute for professional medical care. Always follow your healthcare professional's instructions.

## 2017-12-26 ENCOUNTER — OFFICE VISIT (OUTPATIENT)
Dept: URGENT CARE | Facility: URGENT CARE | Age: 27
End: 2017-12-26
Payer: COMMERCIAL

## 2017-12-26 VITALS
DIASTOLIC BLOOD PRESSURE: 62 MMHG | SYSTOLIC BLOOD PRESSURE: 110 MMHG | TEMPERATURE: 98.1 F | OXYGEN SATURATION: 100 % | HEART RATE: 65 BPM

## 2017-12-26 DIAGNOSIS — H00.011 HORDEOLUM EXTERNUM OF RIGHT UPPER EYELID: Primary | ICD-10-CM

## 2017-12-26 PROCEDURE — 99213 OFFICE O/P EST LOW 20 MIN: CPT | Performed by: PHYSICIAN ASSISTANT

## 2017-12-27 NOTE — NURSING NOTE
"Chief Complaint   Patient presents with     Urgent Care     Eye Problem     R upper eyelid swollen x1-2 days       Initial /62 (BP Location: Right arm, Patient Position: Chair, Cuff Size: Adult Regular)  Pulse 65  Temp 98.1  F (36.7  C) (Oral)  SpO2 100% Estimated body mass index is 20.19 kg/(m^2) as calculated from the following:    Height as of 12/22/16: 5' 11.75\" (1.822 m).    Weight as of 9/21/17: 147 lb 12.8 oz (67 kg).  Medication Reconciliation: complete     Kylee Borden CMA (AAMA)        "

## 2017-12-27 NOTE — PROGRESS NOTES
Subjective    Chief Complaint: Urgent Care and Eye Problem (R upper eyelid swollen x1-2 days)    HPI: 27 year old male presents to the clinic with swelling to his right upper eyelid for a couple days. He has not tired anything for it. He denies any discharge from the eye, eye pain, change in vision, foreign body sensation, photophobia.     PMHx: No past medical history on file.    Meds:   Current Outpatient Prescriptions:      albuterol (PROAIR HFA/PROVENTIL HFA/VENTOLIN HFA) 108 (90 BASE) MCG/ACT Inhaler, Inhale 2 puffs into the lungs every 6 hours as needed for shortness of breath / dyspnea or wheezing, Disp: 1 Inhaler, Rfl: 0     EPINEPHrine 0.3 MG/0.3ML injection, Inject 0.3 mg into the muscle, Disp: , Rfl:      EPINEPHrine (EPIPEN 2-STORMY) 0.3 MG/0.3ML injection, Inject 0.3 mLs (0.3 mg) into the muscle once as needed for anaphylaxis (Patient not taking: Reported on 9/21/2017), Disp: 0.6 mL, Rfl: 0     albuterol (PROAIR HFA, PROVENTIL HFA, VENTOLIN HFA) 108 (90 BASE) MCG/ACT inhaler, Inhale 2 puffs into the lungs every 6 hours, Disp: , Rfl:     Allergies:   Allergies as of 12/26/2017 - Anjel as Reviewed 12/26/2017   Allergen Reaction Noted     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    Objective    Physical Exam  Vitals: Blood pressure 110/62, pulse 65, temperature 98.1  F (36.7  C), temperature source Oral, SpO2 100 %.  General appearance: Patient is in NAD, A&Ox's 3, WN/WD, nontoxic  Head: Normal cephalic, atraumatic   Eyes: right eyelid with pink tender nodule noted to mid eyelid c/w hordeolum. HARPER, EOMI, no foreign bodies, no periorbital cellulitis. No orbital tenderness or signs of hyphema or subconjunctival hemorrhage. No rashes are visualized surrounding the eye.   Skin: warm and dry without rashes seen. No swelling or ecchymosis.  Psych: Alert and oriented, makes good eye contact, is directable and has appropriate responses to  questions, no pressured speech, flight of thought and has good judgment and insight.     Assessment    Hordeolum    Plan    Exam findings c/w stye/hordeolum. Warm compresses 15-20 minutes 4 times per day. Follow up if no improvement after one week or sooner with worsening symptoms.   Medication side effects discussed.    Patient verbalizes understanding, all questions are answered, and is agreeable with treatment plan. Will call if questions or concerns arise    Lauryn STACK

## 2021-02-21 ENCOUNTER — OFFICE VISIT (OUTPATIENT)
Dept: URGENT CARE | Facility: URGENT CARE | Age: 31
End: 2021-02-21
Payer: MEDICAID

## 2021-02-21 VITALS
BODY MASS INDEX: 22.42 KG/M2 | WEIGHT: 164.2 LBS | OXYGEN SATURATION: 100 % | SYSTOLIC BLOOD PRESSURE: 132 MMHG | HEART RATE: 57 BPM | TEMPERATURE: 96.9 F | DIASTOLIC BLOOD PRESSURE: 78 MMHG

## 2021-02-21 DIAGNOSIS — R09.81 SINUS CONGESTION: Primary | ICD-10-CM

## 2021-02-21 DIAGNOSIS — K04.7 DENTAL ABSCESS: ICD-10-CM

## 2021-02-21 LAB
DEPRECATED S PYO AG THROAT QL EIA: NEGATIVE
SPECIMEN SOURCE: NORMAL

## 2021-02-21 PROCEDURE — U0003 INFECTIOUS AGENT DETECTION BY NUCLEIC ACID (DNA OR RNA); SEVERE ACUTE RESPIRATORY SYNDROME CORONAVIRUS 2 (SARS-COV-2) (CORONAVIRUS DISEASE [COVID-19]), AMPLIFIED PROBE TECHNIQUE, MAKING USE OF HIGH THROUGHPUT TECHNOLOGIES AS DESCRIBED BY CMS-2020-01-R: HCPCS | Performed by: FAMILY MEDICINE

## 2021-02-21 PROCEDURE — U0005 INFEC AGEN DETEC AMPLI PROBE: HCPCS | Performed by: FAMILY MEDICINE

## 2021-02-21 PROCEDURE — 87651 STREP A DNA AMP PROBE: CPT | Performed by: FAMILY MEDICINE

## 2021-02-21 PROCEDURE — 99203 OFFICE O/P NEW LOW 30 MIN: CPT | Performed by: FAMILY MEDICINE

## 2021-02-21 PROCEDURE — 99N1174 PR STATISTIC STREP A RAPID: Performed by: FAMILY MEDICINE

## 2021-02-21 NOTE — PROGRESS NOTES
SUBJECTIVE:   Bryson Graves II is a 30 year old male presenting with a chief complaint of left nostril pain and pressure.  Denies any cough, fever.  Onset of symptoms was 4 day(s) ago.  Course of illness is worsening.    Severity moderate  Current and Associated symptoms: pain on left side of nose  Treatment measures tried include Tylenol/Ibuprofen.  Predisposing factors include : Had a root canal and went into sinus infection last month.    Waiting for procedure for tooth, root canal second procedure, developed similar symptoms as last time.  Patient was treated with Amoxicillin and did improve.      No fever.  Denies any significant congestion.  Has taken ibuprofen and tylenol without improvement.    No close contact to positive COVID.    No past medical history on file.  Current Outpatient Medications   Medication Sig Dispense Refill     albuterol (PROAIR HFA, PROVENTIL HFA, VENTOLIN HFA) 108 (90 BASE) MCG/ACT inhaler Inhale 2 puffs into the lungs every 6 hours       albuterol (PROAIR HFA/PROVENTIL HFA/VENTOLIN HFA) 108 (90 BASE) MCG/ACT Inhaler Inhale 2 puffs into the lungs every 6 hours as needed for shortness of breath / dyspnea or wheezing 1 Inhaler 0     EPINEPHrine (EPIPEN 2-STORMY) 0.3 MG/0.3ML injection Inject 0.3 mLs (0.3 mg) into the muscle once as needed for anaphylaxis 0.6 mL 0     EPINEPHrine 0.3 MG/0.3ML injection Inject 0.3 mg into the muscle       Social History     Tobacco Use     Smoking status: Never Smoker     Smokeless tobacco: Never Used   Substance Use Topics     Alcohol use: No     Alcohol/week: 0.0 standard drinks       ROS:  Review of systems negative except as stated above.    OBJECTIVE:  /78   Pulse 57   Temp 96.9  F (36.1  C)   Wt 74.5 kg (164 lb 3.2 oz)   SpO2 100%   BMI 22.42 kg/m    GENERAL APPEARANCE: healthy, alert and no distress  EYES: EOMI,  PERRL, conjunctiva clear  HENT: Nose left side with mild swelling and tenderness.  Mouth without ulcers, erythema  or lesions.  Dental fillings on upper molars  PSYCH: mentation appears normal and affect normal/bright    Results for orders placed or performed in visit on 02/21/21   Streptococcus A Rapid Scr w Reflx to PCR     Status: None    Specimen: Throat   Result Value Ref Range    Strep Specimen Description Throat     Streptococcus Group A Rapid Screen Negative NEG^Negative       ASSESSMENT/PLAN:  (R09.81) Sinus congestion  (primary encounter diagnosis)  Plan: Streptococcus A Rapid Scr w Reflx to PCR,         Symptomatic COVID-19 Virus (Coronavirus) by         PCR, Group A Streptococcus PCR Throat Swab            (K04.7) Dental abscess  Plan: amoxicillin-clavulanate (AUGMENTIN) 875-125 MG         tablet            Reassurance given, reviewed symptomatic treatment with tylenol, ibuprofen, plenty of fluids and rest.  Discussed sinus symptoms and underlying dental problems and suspect that may have infection/abscess.  RX Augmentin given for treatment and reviewed importance of dental follow up for definitive treatment.    Discussed possible COVID infection with symptoms presentation, COVID screen obtained today and reviewed quarantine for 10 days from symptoms onset.    Follow up with dental clinic in 1 week  Follow up with primary provider in 1-2 weeks.    Lux Cheung MD,  February 21, 2021 5:27 PM

## 2021-02-22 LAB
LABORATORY COMMENT REPORT: NORMAL
SARS-COV-2 RNA RESP QL NAA+PROBE: NEGATIVE
SARS-COV-2 RNA RESP QL NAA+PROBE: NORMAL
SPECIMEN SOURCE: NORMAL
STREP GROUP A PCR: NOT DETECTED

## 2021-03-07 ENCOUNTER — OFFICE VISIT (OUTPATIENT)
Dept: URGENT CARE | Facility: URGENT CARE | Age: 31
End: 2021-03-07
Payer: MEDICAID

## 2021-03-07 VITALS
OXYGEN SATURATION: 97 % | BODY MASS INDEX: 22.4 KG/M2 | WEIGHT: 164 LBS | SYSTOLIC BLOOD PRESSURE: 146 MMHG | HEART RATE: 89 BPM | TEMPERATURE: 97.2 F | DIASTOLIC BLOOD PRESSURE: 95 MMHG

## 2021-03-07 DIAGNOSIS — J01.11 ACUTE RECURRENT FRONTAL SINUSITIS: Primary | ICD-10-CM

## 2021-03-07 PROCEDURE — 99213 OFFICE O/P EST LOW 20 MIN: CPT | Performed by: PHYSICIAN ASSISTANT

## 2021-03-07 RX ORDER — CEFDINIR 300 MG/1
300 CAPSULE ORAL 2 TIMES DAILY
Qty: 14 CAPSULE | Refills: 0 | Status: SHIPPED | OUTPATIENT
Start: 2021-03-07 | End: 2021-03-14

## 2021-03-08 NOTE — PATIENT INSTRUCTIONS
1.  Plenty of fluids, rest, warm compresses on face  2.  Mucinex twice daily for at least 4 days  3.  Andreia Pot 1x in the morning 1x at night (SALINE MIST SPRAY IS AN ACCEPTABLE, THOUGH NOT AS EFFECTIVE REPLACEMENT)  4.  Benadryl (diphenhydramine) at bedtime   5.  Either Claritin (Loratadine), Allegra (Fexofenadine), or Zyrtec (Cetirizine) in the day  6.  Flonase (Fluticasone) 2x each nostril twice a day for two weeks, then once each nostril once a day  7. Antibiotic twice daily for 7 Days  8. Probiotic (ie Culturelle) 1-2 hours after each antibiotic dose     Please let us know if symptoms persist, or worsen.      Patient Education     Self-Care for Sinusitis     Drinking plenty of water can help sinuses drain.   Sinusitis can often be managed with self-care. Self-care can keep sinuses moist and make you feel more comfortable. Remember to follow your doctor's instructions closely. This can make a big difference in getting your sinus problem under control.  Drink fluids  Drinking extra fluids helps thin your mucus. This lets it drain from your sinuses more easily. Have a glass of water every hour or two. A humidifier helps in much the same way. Fluids can also offset the drying effects of certain medicines. If you use a humidifier, follow the product maker's instructions on how to use it. Clean it on a regular schedule.  Use saltwater rinses  Rinses help keep your sinuses and nose moist. Mix a teaspoon of salt in 8 ounces of fresh, warm water. Use a bulb syringe to gently squirt the water into your nose a few times a day. You can also buy ready-made saline nasal sprays.  Apply hot or cold packs  Applying heat to the area surrounding your sinuses may make you feel more comfortable. Use a hot water bottle or a hand towel dipped in hot water. Some people also find ice packs effective for relieving pain.  Medicines  Your doctor may prescribe medications to help treat your sinusitis. If you have an infection,  antibiotics can help clear it up. If you are prescribed antibiotics, take all pills on schedule until they are gone, even if you feel better. Decongestants help relieve swelling. Use decongestant sprays for short periods only under the direction of your doctor. If you have allergies, your doctor may prescribe medications to help relieve them.   Date Last Reviewed: 10/1/2016    7337-5255 [employer]. [address] [place] [address], [place] [address]. All rights reserved. This information is not intended as a substitute for professional medical care. Always follow your healthcare professional's instructions.

## 2021-03-08 NOTE — PROGRESS NOTES
SUBJECTIVE:  Bryson Graves II is a 30 year old male here with concerns about sinus infection.  He states onset of symptoms were 2 week(s) ago.  He has had maxillary pressure. Course of illness is returning after relief from ABs. Severity moderate  Current and Associated symptoms: nasal congestion, rhinorrhea, facial pain/pressure and tooth pain  Predisposing factors include None. Recent treatment has included: Augmentin    No past medical history on file.  Social History     Tobacco Use     Smoking status: Never Smoker     Smokeless tobacco: Never Used   Substance Use Topics     Alcohol use: No     Alcohol/week: 0.0 standard drinks       ROS:  10 point ROS negative except as listed above      OBJECTIVE:  BP (!) 146/95   Pulse 89   Temp 97.2  F (36.2  C)   Wt 74.4 kg (164 lb)   SpO2 97%   BMI 22.40 kg/m    Exam:GENERAL APPEARANCE: healthy, alert and no distress  EYES: conjunctiva clear  RESP: lungs clear to auscultation - no rales, rhonchi or wheezes  CV: regular rates and rhythm, normal S1 S2, no murmur noted  NEURO: Normal strength and tone, sensory exam grossly normal,  normal speech and mentation  SKIN: no suspicious lesions or rashes      ASSESSMENT:  (J01.11) Acute recurrent frontal sinusitis  (primary encounter diagnosis)  Plan: cefdinir (OMNICEF) 300 MG capsule      Patient Instructions         1.  Plenty of fluids, rest, warm compresses on face  2.  Mucinex twice daily for at least 4 days  3.  Andreia Pot 1x in the morning 1x at night (SALINE MIST SPRAY IS AN ACCEPTABLE, THOUGH NOT AS EFFECTIVE REPLACEMENT)  4.  Benadryl (diphenhydramine) at bedtime   5.  Either Claritin (Loratadine), Allegra (Fexofenadine), or Zyrtec (Cetirizine) in the day  6.  Flonase (Fluticasone) 2x each nostril twice a day for two weeks, then once each nostril once a day  7. Antibiotic twice daily for 7 Days  8. Probiotic (ie Culturelle) 1-2 hours after each antibiotic dose     Please let us know if symptoms persist,  or worsen.      Patient Education     Self-Care for Sinusitis     Drinking plenty of water can help sinuses drain.   Sinusitis can often be managed with self-care. Self-care can keep sinuses moist and make you feel more comfortable. Remember to follow your doctor's instructions closely. This can make a big difference in getting your sinus problem under control.  Drink fluids  Drinking extra fluids helps thin your mucus. This lets it drain from your sinuses more easily. Have a glass of water every hour or two. A humidifier helps in much the same way. Fluids can also offset the drying effects of certain medicines. If you use a humidifier, follow the product maker's instructions on how to use it. Clean it on a regular schedule.  Use saltwater rinses  Rinses help keep your sinuses and nose moist. Mix a teaspoon of salt in 8 ounces of fresh, warm water. Use a bulb syringe to gently squirt the water into your nose a few times a day. You can also buy ready-made saline nasal sprays.  Apply hot or cold packs  Applying heat to the area surrounding your sinuses may make you feel more comfortable. Use a hot water bottle or a hand towel dipped in hot water. Some people also find ice packs effective for relieving pain.  Medicines  Your doctor may prescribe medications to help treat your sinusitis. If you have an infection, antibiotics can help clear it up. If you are prescribed antibiotics, take all pills on schedule until they are gone, even if you feel better. Decongestants help relieve swelling. Use decongestant sprays for short periods only under the direction of your doctor. If you have allergies, your doctor may prescribe medications to help relieve them.   Date Last Reviewed: 10/1/2016    6609-9380 [employer]. [address] [place] [address], [place] [address]. All rights reserved. This information is not intended as a substitute for professional medical care. Always follow your healthcare professional's  instructions.

## 2021-03-23 ENCOUNTER — OFFICE VISIT (OUTPATIENT)
Dept: URGENT CARE | Facility: URGENT CARE | Age: 31
End: 2021-03-23
Payer: MEDICAID

## 2021-03-23 VITALS
OXYGEN SATURATION: 98 % | WEIGHT: 164 LBS | TEMPERATURE: 97.4 F | BODY MASS INDEX: 22.4 KG/M2 | DIASTOLIC BLOOD PRESSURE: 72 MMHG | SYSTOLIC BLOOD PRESSURE: 124 MMHG | HEART RATE: 71 BPM

## 2021-03-23 DIAGNOSIS — K13.79 ORAL PAIN: Primary | ICD-10-CM

## 2021-03-23 LAB — WBC # BLD AUTO: 6 10E9/L (ref 4–11)

## 2021-03-23 PROCEDURE — 99214 OFFICE O/P EST MOD 30 MIN: CPT | Performed by: PHYSICIAN ASSISTANT

## 2021-03-23 PROCEDURE — 85048 AUTOMATED LEUKOCYTE COUNT: CPT | Performed by: PHYSICIAN ASSISTANT

## 2021-03-23 PROCEDURE — 36415 COLL VENOUS BLD VENIPUNCTURE: CPT | Performed by: PHYSICIAN ASSISTANT

## 2021-03-23 RX ORDER — SERTRALINE HYDROCHLORIDE 25 MG/1
25 TABLET, FILM COATED ORAL DAILY
COMMUNITY

## 2021-03-23 NOTE — PATIENT INSTRUCTIONS
Patient Education     Acute Bacterial Rhinosinusitis (ABRS)    Acute bacterial rhinosinusitis (ABRS) is an infection of your nasal cavity and sinuses. It s caused by bacteria. Acute means that you ve had symptoms for less than 4 weeks, but possibly up to 12 weeks.  Understanding your sinuses  The nasal cavity is the large air-filled space behind your nose. The sinuses are a group of spaces formed by the bones of your face. They connect with your nasal cavity. ABRS causes the tissue lining these spaces to become inflamed. Mucus may not drain normally. This leads to facial pain and other symptoms.  What causes ABRS?  ABRS most often follows an upper respiratory infection caused by a virus. Bacteria then infect the lining of your nasal cavity and sinuses. But you can also get ABRS if you have:    Nasal allergies    Long-term nasal swelling and congestion not caused by allergies    Blockage in the nose  Symptoms of ABRS  The symptoms of ABRS may be different for each person and include:    Nasal congestion or blockage    Pain or pressure in the face    Thick, colored drainage from the nose  Other symptoms may include:    Runny nose    Fluid draining from the nose down the throat (postnasal drip)    Headache    Cough    Pain    Fever  Diagnosing ABRS  ABRS may be diagnosed if you ve had an upper respiratory infection like a cold and cough for 10 or more days without improvement or with worsening symptoms. Your healthcare provider will ask about your symptoms and your medical history. The provider will check your vital signs, including your temperature. You ll have a physical exam. The healthcare provider will check your ears, nose, and throat. You likely won t need any tests. If ABRS comes back, you may have a culture or other tests.  Treatment for ABRS  Treatment may include:    Antibiotic medicine. This is for symptoms that last for at least 10 to 14 days.    Nasal corticosteroid medicine. Drops or spray used in the  nose can lessen swelling and congestion.    Over-the-counter pain medicine. This is to lessen sinus pain and pressure.    Nasal decongestant medicine. Spray or drops may help to lessen congestion. Do not use them for more than a few days.    Salt wash (saline irrigation). This can help to loosen mucus.  Possible complications of ABRS  ABRS may come back or become long-term (chronic). In rare cases, ABRS may cause complications such as:     Inflamed tissue around the brain and spinal cord (meningitis)    Inflamed tissue around the eyes (orbital cellulitis)    Inflamed bones around the sinuses (osteitis)  These problems may need to be treated in a hospital with intravenous (IV) antibiotic medicine or surgery.  When to call the healthcare provider  Call your healthcare provider if you have any of the following:    Symptoms that don t get better, or get worse    Symptoms that don t get better after 3 to 5 days on antibiotics    Trouble seeing    Swelling around your eyes    Confusion or trouble staying awake   Rhonda last reviewed this educational content on 5/1/2017 2000-2020 [employer]. All rights reserved. This information is not intended as a substitute for professional medical care. Always follow your healthcare professional's instructions.

## 2021-03-23 NOTE — PROGRESS NOTES
SUBJECTIVE:   Bryson Graves II is a 30 year old male presenting with a chief complaint of persisting pain in right sinus/upper jaw.  Finished AB which had helped 3 days ago. No fevers.     No past medical history on file.  Current Outpatient Medications   Medication Sig Dispense Refill     albuterol (PROAIR HFA, PROVENTIL HFA, VENTOLIN HFA) 108 (90 BASE) MCG/ACT inhaler Inhale 2 puffs into the lungs every 6 hours       albuterol (PROAIR HFA/PROVENTIL HFA/VENTOLIN HFA) 108 (90 BASE) MCG/ACT Inhaler Inhale 2 puffs into the lungs every 6 hours as needed for shortness of breath / dyspnea or wheezing 1 Inhaler 0     EPINEPHrine (EPIPEN 2-STORMY) 0.3 MG/0.3ML injection Inject 0.3 mLs (0.3 mg) into the muscle once as needed for anaphylaxis 0.6 mL 0     EPINEPHrine 0.3 MG/0.3ML injection Inject 0.3 mg into the muscle       sertraline (ZOLOFT) 25 MG tablet Take 25 mg by mouth daily       Social History     Tobacco Use     Smoking status: Never Smoker     Smokeless tobacco: Never Used   Substance Use Topics     Alcohol use: No     Alcohol/week: 0.0 standard drinks       ROS:  10 point ROS negative except as listed above      OBJECTIVE:  /72   Pulse 71   Temp 97.4  F (36.3  C) (Tympanic)   Wt 74.4 kg (164 lb)   SpO2 98%   BMI 22.40 kg/m    GENERAL APPEARANCE: healthy, alert and no distress  EYES: conjunctiva clear  HENT: ear canals and TM's normal.  Nose and mouth without ulcers, erythema or lesions  NECK: supple, nontender, no lymphadenopathy  RESP: lungs clear to auscultation - no rales, rhonchi or wheezes  CV: regular rates and rhythm, normal S1 S2, no murmur noted  NEURO: Normal strength and tone, sensory exam grossly normal,  normal speech and mentation  SKIN: no suspicious lesions or rashes      Results for orders placed or performed in visit on 03/23/21   WBC count     Status: None   Result Value Ref Range    WBC 6.0 4.0 - 11.0 10e9/L       ASSESSMENT:  (K13.79) Oral pain  (primary encounter  diagnosis)  Plan: WBC count, amoxicillin-clavulanate (AUGMENTIN)         875-125 MG tablet, OTOLARYNGOLOGY REFERRAL      Patient Instructions       Patient Education     Acute Bacterial Rhinosinusitis (ABRS)    Acute bacterial rhinosinusitis (ABRS) is an infection of your nasal cavity and sinuses. It s caused by bacteria. Acute means that you ve had symptoms for less than 4 weeks, but possibly up to 12 weeks.  Understanding your sinuses  The nasal cavity is the large air-filled space behind your nose. The sinuses are a group of spaces formed by the bones of your face. They connect with your nasal cavity. ABRS causes the tissue lining these spaces to become inflamed. Mucus may not drain normally. This leads to facial pain and other symptoms.  What causes ABRS?  ABRS most often follows an upper respiratory infection caused by a virus. Bacteria then infect the lining of your nasal cavity and sinuses. But you can also get ABRS if you have:    Nasal allergies    Long-term nasal swelling and congestion not caused by allergies    Blockage in the nose  Symptoms of ABRS  The symptoms of ABRS may be different for each person and include:    Nasal congestion or blockage    Pain or pressure in the face    Thick, colored drainage from the nose  Other symptoms may include:    Runny nose    Fluid draining from the nose down the throat (postnasal drip)    Headache    Cough    Pain    Fever  Diagnosing ABRS  ABRS may be diagnosed if you ve had an upper respiratory infection like a cold and cough for 10 or more days without improvement or with worsening symptoms. Your healthcare provider will ask about your symptoms and your medical history. The provider will check your vital signs, including your temperature. You ll have a physical exam. The healthcare provider will check your ears, nose, and throat. You likely won t need any tests. If ABRS comes back, you may have a culture or other tests.  Treatment for ABRS  Treatment may  include:    Antibiotic medicine. This is for symptoms that last for at least 10 to 14 days.    Nasal corticosteroid medicine. Drops or spray used in the nose can lessen swelling and congestion.    Over-the-counter pain medicine. This is to lessen sinus pain and pressure.    Nasal decongestant medicine. Spray or drops may help to lessen congestion. Do not use them for more than a few days.    Salt wash (saline irrigation). This can help to loosen mucus.  Possible complications of ABRS  ABRS may come back or become long-term (chronic). In rare cases, ABRS may cause complications such as:     Inflamed tissue around the brain and spinal cord (meningitis)    Inflamed tissue around the eyes (orbital cellulitis)    Inflamed bones around the sinuses (osteitis)  These problems may need to be treated in a hospital with intravenous (IV) antibiotic medicine or surgery.  When to call the healthcare provider  Call your healthcare provider if you have any of the following:    Symptoms that don t get better, or get worse    Symptoms that don t get better after 3 to 5 days on antibiotics    Trouble seeing    Swelling around your eyes    Confusion or trouble staying awake   [employer] last reviewed this educational content on 5/1/2017 2000-2020 [employer]. All rights reserved. This information is not intended as a substitute for professional medical care. Always follow your healthcare professional's instructions.

## 2021-04-24 ENCOUNTER — OFFICE VISIT (OUTPATIENT)
Dept: URGENT CARE | Facility: URGENT CARE | Age: 31
End: 2021-04-24
Payer: COMMERCIAL

## 2021-04-24 VITALS
TEMPERATURE: 98 F | OXYGEN SATURATION: 98 % | HEART RATE: 78 BPM | SYSTOLIC BLOOD PRESSURE: 120 MMHG | DIASTOLIC BLOOD PRESSURE: 78 MMHG | RESPIRATION RATE: 20 BRPM

## 2021-04-24 DIAGNOSIS — L03.311 CELLULITIS OF ABDOMINAL WALL: Primary | ICD-10-CM

## 2021-04-24 PROCEDURE — 99213 OFFICE O/P EST LOW 20 MIN: CPT | Performed by: PHYSICIAN ASSISTANT

## 2021-04-24 RX ORDER — CEPHALEXIN 500 MG/1
500 CAPSULE ORAL 3 TIMES DAILY
Qty: 30 CAPSULE | Refills: 0 | Status: SHIPPED | OUTPATIENT
Start: 2021-04-24 | End: 2021-05-04

## 2021-04-24 NOTE — PATIENT INSTRUCTIONS
Patient Education     Cellulitis  Cellulitis is an infection of the deep layers of skin. A break in the skin, such as a cut or scratch, can let bacteria under the skin. If the bacteria get to deep layers of the skin, it can be serious. If not treated, cellulitis can get into the bloodstream and lymph nodes. The infection can then spread throughout the body. This causes serious illness.   Cellulitis causes the affected skin to become red, swollen, warm, and sore. The reddened areas have a visible border. An open sore may leak fluid (pus). You may have a fever, chills, and pain.   Cellulitis is treated with antibiotics taken for 7 to 10 days. An open sore may be cleaned and covered with cool wet gauze. Symptoms should get better 1 to 2 days after treatment is started. Make sure to take all the antibiotics for the full number of days until they are gone. Keep taking the medicine even if your symptoms go away.   Home care  Follow these tips:    Limit the use of the part of your body with cellulitis.     If the infection is on your leg, keep your leg raised while sitting. This helps reduce swelling.    Take all of the antibiotic medicine exactly as directed until it is gone. Don't miss any doses, especially during the first 7 days. Don t stop taking the medicine when your symptoms get better.    Keep the affected area clean and dry.    Wash your hands with soap and clean, running water before and after touching your skin. Anyone else who touches your skin should also wash his or her hands. Don't share towels.  Follow-up care  Follow up with your healthcare provider, or as advised. If your infection doesn't go away on the first antibiotic, your healthcare provider will prescribe a different one.   When to seek medical advice  Call your healthcare provider right away if any of these occur:    Red areas that spread    Swelling or pain that gets worse    Fluid leaking from the skin (pus)    Fever higher of 100.4  F (38.0   C) or higher after 2 days on antibiotics  Rhonda last reviewed this educational content on 8/1/2019 2000-2021 [employer]. All rights reserved. This information is not intended as a substitute for professional medical care. Always follow your healthcare professional's instructions.                 April 24, 2021  Urgent Care Plan:     1. Start the antibiotics I prescribed today     2.  Take the  full course of antibiotic as prescribed.     3.  Follow-up with your primary care provider  if your symptoms do not improve after 4-6 doses of antibiotic.     4. Follow up immediately if your symptoms worsen or spread after you start the antibiotic I prescribed today.     5. Follow-up immediately if you develop fever, chills, back pain, abdominal pain, nausea, vomiting, weakness or any new symptoms.

## 2021-04-24 NOTE — PROGRESS NOTES
"ASSESSMENT/PLAN:      (L03.311) Cellulitis of abdominal wall  (primary encounter diagnosis)  MDM: Abdominal wall cellulitis--possibly related to pimple or ingrown hair infection. No associated systemic symptoms. Nothing fluctuant to incise and drain today. Plan as per below:     Plan:       April 24, 2021  Urgent Care Plan:     1. Start the antibiotics I prescribed today     2.  Take the  full course of antibiotic as prescribed.     3.  Follow-up with your primary care provider  if your symptoms do not improve after 4-6 doses of antibiotic.     4. Follow up immediately if your symptoms worsen or spread after you start the antibiotic I prescribed today.     5. Follow-up immediately if you develop fever, chills, back pain, abdominal pain, nausea, vomiting, weakness or any new symptoms.      In addition to the above, cellulitis \"red flag\" signs and symptoms are reviewed with patient both verbally and by way of printed educational material for home review.  He  verbalizes understanding of and agrees to the above plan.     SUBJECTIVE:    Bryson Graves II is a 30 year old male who presents to urgent care today for evaluation of infection on abdomen. Patient states it may have started with infected hair follicle (notes he does shave that area) or may have been pimple.      HPI: Patient noted a painful, red, lump just left of belly button area approximately 3 days ago. It grew more tender and larger, so he tried to \"pop\" it. He did have some white drainage, but later noted increased pain, swelling and spreading redness--prompting today's visit.       ROS:   CONSTITUTIONAL: No bone pain, fever or chills   CARDIAC: No acute chest pain or shortness of breath   RESP: No acute cough, chest pain or shortness of breath   GI: No nausea, vomiting or diarrhea.  No acute  abdominal pain (outside of skin lesion described above).  SKIN: Positive as per HPI. No rash or lesions elsewhere   NEURO: No headache, neck stiffness " or lethargy.   RHEUM: No red, warm or swollen joints  MUS/SKEL: No history of acute abdominal injury or trauma    History reviewed. No pertinent past medical history.    Current Outpatient Medications   Medication     albuterol (PROAIR HFA, PROVENTIL HFA, VENTOLIN HFA) 108 (90 BASE) MCG/ACT inhaler     albuterol (PROAIR HFA/PROVENTIL HFA/VENTOLIN HFA) 108 (90 BASE) MCG/ACT Inhaler     EPINEPHrine (EPIPEN 2-STORMY) 0.3 MG/0.3ML injection     EPINEPHrine 0.3 MG/0.3ML injection     sertraline (ZOLOFT) 25 MG tablet     No current facility-administered medications for this visit.      Allergies   Allergen Reactions     Boo Flavor Anaphylaxis     Nuts Anaphylaxis     Also swelling in face         OBJECTIVE:  /78   Pulse 78   Temp 98  F (36.7  C) (Tympanic)   Resp 20   SpO2 98%     General appearance: alert and no apparent distress  SKIN: LEFT of UMBILICUS (not in communication with umbilicus)--Positive for an approximately nickel sized, firm, red, raised lump, with surrounding  5 cm x 5.5 cm (measured) area  of erythematous, warm, skin on abdominal wall. Nothing fluctuant to incise and drain today. Remainder  of Skin color is pink and without rash.  HEENT:   Conjunctiva not injected.  Sclera clear.  CARDIAC:NORMAL - regular rate and rhythm without murmur.  RESP: Normal - CTA without rales, rhonchi, or wheezing.  ABDOMEN: Abdomen soft, non-tender. BS normal. No masses, organomegaly

## 2021-05-30 ENCOUNTER — OFFICE VISIT (OUTPATIENT)
Dept: URGENT CARE | Facility: URGENT CARE | Age: 31
End: 2021-05-30
Payer: COMMERCIAL

## 2021-05-30 ENCOUNTER — HOSPITAL ENCOUNTER (EMERGENCY)
Facility: CLINIC | Age: 31
Discharge: HOME | End: 2021-05-30
Attending: EMERGENCY MEDICINE | Admitting: EMERGENCY MEDICINE
Payer: COMMERCIAL

## 2021-05-30 VITALS
DIASTOLIC BLOOD PRESSURE: 78 MMHG | HEART RATE: 68 BPM | TEMPERATURE: 98.6 F | OXYGEN SATURATION: 99 % | RESPIRATION RATE: 16 BRPM | SYSTOLIC BLOOD PRESSURE: 125 MMHG

## 2021-05-30 VITALS
SYSTOLIC BLOOD PRESSURE: 129 MMHG | OXYGEN SATURATION: 98 % | HEART RATE: 90 BPM | WEIGHT: 160 LBS | DIASTOLIC BLOOD PRESSURE: 86 MMHG | BODY MASS INDEX: 21.85 KG/M2 | TEMPERATURE: 96 F

## 2021-05-30 DIAGNOSIS — R22.32 AXILLARY MASS, LEFT: Primary | ICD-10-CM

## 2021-05-30 DIAGNOSIS — L02.419 AXILLARY ABSCESS: ICD-10-CM

## 2021-05-30 PROCEDURE — 99283 EMERGENCY DEPT VISIT LOW MDM: CPT | Mod: 25

## 2021-05-30 PROCEDURE — 99214 OFFICE O/P EST MOD 30 MIN: CPT | Performed by: FAMILY MEDICINE

## 2021-05-30 PROCEDURE — 10061 I&D ABSCESS COMP/MULTIPLE: CPT

## 2021-05-30 RX ORDER — OXYCODONE AND ACETAMINOPHEN 5; 325 MG/1; MG/1
1-2 TABLET ORAL EVERY 4 HOURS PRN
Qty: 8 TABLET | Refills: 0 | Status: SHIPPED | OUTPATIENT
Start: 2021-05-30

## 2021-05-30 RX ORDER — LIDOCAINE HYDROCHLORIDE AND EPINEPHRINE 10; 10 MG/ML; UG/ML
INJECTION, SOLUTION INFILTRATION; PERINEURAL
Status: DISCONTINUED
Start: 2021-05-30 | End: 2021-05-30 | Stop reason: HOSPADM

## 2021-05-30 RX ORDER — CEPHALEXIN 500 MG/1
500 CAPSULE ORAL 4 TIMES DAILY
Qty: 40 CAPSULE | Refills: 0 | Status: SHIPPED | OUTPATIENT
Start: 2021-05-30 | End: 2021-06-09

## 2021-05-30 NOTE — PATIENT INSTRUCTIONS
To [facility]  ER   [address] [place] [address]    Need to have area of under arm ultrasound before draining area

## 2021-05-30 NOTE — ED PROVIDER NOTES
History     Chief Complaint:  Arm Pain      The history is provided by the patient.      Bryson Graves II is a 30 year old male who presents with a painful lumps in his left arm axilla starting five days ago. He thought that pain was due to ingrown hairs but the pain has since increased. He presents today concerned for the increasing pain.  He has never had anything like this before.  He does not have diabetes.  He has no fever or chills.  He has no other complaints.    Review of Systems   Constitutional: Negative for chills and fever.   Musculoskeletal:        (+) arm pain in left axilla    All other systems reviewed and are negative.      Allergies:  No Known Drug Allergies     Medications:    albuterol inhaler  Epinephrine  Zoloft   Paxil    Past Medical History:    Asthma  OCD  Depression  Anxiety    Social History:  Patient is a father.  Patient likes to golf.    Physical Exam     Patient Vitals for the past 24 hrs:   BP Temp Pulse Resp SpO2   05/30/21 1500 125/78 -- -- -- 99 %   05/30/21 1350 -- -- -- -- 100 %   05/30/21 1345 129/87 -- 68 -- --   05/30/21 1221 (!) 138/107 98.6  F (37  C) 90 16 98 %       Physical Exam  Constitutional: Well appearing.  HEENT: Atraumatic.  Moist mucous membranes.  Neck: Soft.  Supple.    Musculoskeletal: There are 3 areas of fluctuance noted in the left axillary region.  These are very tender to the touch.  There is some overlying erythema.  No edema.  Normal range of motion.  Neurologic: Alert and oriented x3.  Normal tone and bulk. Normal gait.  Skin: No rashes.  No edema.  Psych: Normal affect.  Normal behavior.      Emergency Department Course       Procedures:    Incision and Drainage     LOCATIONS:  Left Axilla     ANESTHESIA:  Local field block using Lidocaine 1% with epinephrine, total of 6 mLs     PREPARATION:  None.     PROCEDURE:  Area was incised with # 11 Blade ([place]) with three Single Straight incisions.  Wound treatment included Deloculation and  Purulent Drainage.  Packing consisted of Iodoform Gauze.  Appropriate dressing was applied to cover the area.    PATIENT STATUS:        Patient tolerated the procedure well. There were no complications.             Emergency Department Course:  Reviewed:  I reviewed nursing notes, vitals, past history and care everywhere    Assessments:  1255 I obtained history and examined the patient as noted above.   1350 I rechecked the patient and performed a bedside ultrasound.  1358 I performed incision and drainage noted above.   1447 I rechecked with the patient. The patient is safe for discharge.    Disposition:  The patient was discharged to home.    Impression & Plan      Medical Decision Making:  .Name is a 22-year-old man who is afebrile and hemodynamically stable.  He has multiple abscesses in his left axillary area.  We discussed the options today and is agreeable to incision and drainage was performed as above.  This was successful and purulent material was drained.  He is feeling much improved.  Discussed plan for home with Keflex given the earlier folliculitis and some mild redness of the area.  I discussed supportive care at home and the need to follow-up with primary care.  He is in agreement with plan his questions were answered.  He was cautioned on shaving the area in the future and is understanding.  Discussed return precautions and supportive care at home.  This was a painful procedure and I did prescribe a small amount of pain medication and he was counseled on the use of opiate pain medication and reviewed on [place] prescription monitoring database.  He is in agreement with plan, his questions were answered, and he was in no distress time of discharge.    Diagnosis:    ICD-10-CM    1. Axillary abscess  L02.419 Primary Care Referral       Discharge Medications:  Discharge Medication List as of 5/30/2021  3:07 PM      START taking these medications    Details   cephALEXin (KEFLEX) 500 MG capsule Take 1  capsule (500 mg) by mouth 4 times daily for 10 days, Disp-40 capsule, R-0, E-Prescribe      oxyCODONE-acetaminophen (PERCOCET) 5-325 MG tablet Take 1-2 tablets by mouth every 4 hours as needed for pain, Disp-8 tablet, R-0, E-Prescribe               Scribe Disclosure:  I, Fanny Young, am serving as a scribe at 12:56 PM on 5/30/2021 to document services personally performed by Dru Hayes MD based on my observations and the provider's statements to me.     I, Red Danya, am serving as a scribe on 5/30/2021 at 4:00 PM to personally document services performed by Dru Hayes MD found based on my observations and the provider's statements to me.       Dru Hayes MD  06/01/21 [id number]

## 2021-05-30 NOTE — PROGRESS NOTES
SUBJECTIVE:   Bryson Graves II is a 30 year old male presenting with a chief complaint of   Chief Complaint   Patient presents with     BUMP UNDER ARMPIT     PT STATED NOT OOZING BUT HURTS REALLY BAD SUPER TENDER ONGOING FOR 5 DAYS       He is an established patient of [facility].    Left axilla mass     Onset of  Left axilla swelling was 5 day(s) ago. No history of similar in past, no history of cyst/hidradenitis in the past   Course of illness is worsening.  Severity moderately severe  No fever, pain/swelling more severe      ROS: ROS neg other than the symptoms noted above in the HPI.    No past medical history on file.  No family history on file.  Current Outpatient Medications   Medication Sig Dispense Refill     albuterol (PROAIR HFA, PROVENTIL HFA, VENTOLIN HFA) 108 (90 BASE) MCG/ACT inhaler Inhale 2 puffs into the lungs every 6 hours       albuterol (PROAIR HFA/PROVENTIL HFA/VENTOLIN HFA) 108 (90 BASE) MCG/ACT Inhaler Inhale 2 puffs into the lungs every 6 hours as needed for shortness of breath / dyspnea or wheezing 1 Inhaler 0     EPINEPHrine (EPIPEN 2-STORMY) 0.3 MG/0.3ML injection Inject 0.3 mLs (0.3 mg) into the muscle once as needed for anaphylaxis 0.6 mL 0     EPINEPHrine 0.3 MG/0.3ML injection Inject 0.3 mg into the muscle       sertraline (ZOLOFT) 25 MG tablet Take 25 mg by mouth daily       Social History     Tobacco Use     Smoking status: Never Smoker     Smokeless tobacco: Never Used   Substance Use Topics     Alcohol use: No     Alcohol/week: 0.0 standard drinks       OBJECTIVE  /86   Pulse 90   Temp 96  F (35.6  C)   Wt 72.6 kg (160 lb)   SpO2 98%   BMI 21.85 kg/m      Physical Exam  Constitutional:       General: He is in acute distress.      Comments: Moderate distress due to pain in left axilla    HENT:      Head: Normocephalic.   Neck:      Musculoskeletal: Normal range of motion and neck supple. No muscular tenderness.   Musculoskeletal:      Comments: Pain in left  axilla, limited ROM of left arm with passive and active abduction and adduction, swelling in left axilla, no streaking, no swelling of left arm compared to right    No pain, FROM of r arm, no swelling/pain in axilla    Lymphadenopathy:      Cervical: No cervical adenopathy.   Skin:     Comments: Left axilla-swelling of entire axilla, tender, mild erythema,firm scattered nodular lesions with ? Small fluctuant loculated area in proximal portion of wound at 12 oclock  small 0.2 area excoriated red, mild eschar at 11:00   Neurological:      General: No focal deficit present.      Mental Status: He is alert.      Gait: Gait normal.   Psychiatric:         Mood and Affect: Mood normal.         Labs/xrays  No results found for this or any previous visit (from the past 24 hour(s)). no xrays        ASSESSMENT:      ICD-10-CM    1. Axillary mass, left  R22.32       Multiple palpable small masses in left axilla, tender,   ? Lymphadenopathy vs loculated abscess, needs ultrasound     Patient transferred to [facility] ER,  Called ER, ER RN  aware of patient, will transfer via POV, patient will drive     On the day of the encounter, time spend on chart review, patient visit, review of testing, documentation and discussion with other providers was 30 minutes     Patient Instructions     To [facility]  ER   [address] [place] [address]    Need to have area of under arm ultrasound before draining area       Sarah Chung MD

## 2021-05-30 NOTE — ED NOTES
Dressed pt under arm with Sponge Guaze and Kerlix. Educated pt on dressing changes and keeping it clean.

## 2021-06-01 ASSESSMENT — ENCOUNTER SYMPTOMS
CHILLS: 0
FEVER: 0

## 2021-06-07 RX ORDER — SERTRALINE HYDROCHLORIDE 100 MG/1
TABLET, FILM COATED ORAL
COMMUNITY
Start: 2021-05-03

## 2021-06-07 NOTE — PROGRESS NOTES
Assessment & Plan     Axillary abscess: Lt axilla    Seen in the ER for multiple abscesses in his left axillary area, incision and drainage was performed and was successful and purulent material was drained. He is doing fine today.  - Primary Care Referral    Return in about 3 months (around 9/8/2021) for Physical Exam.    Sina Plunkett MD  [facility] JAMES Massey is a 30 year old who presents for the following health issues:    \[facility] [place] [facility]\""     ED/UC Followup:    Facility:  [facility]  Date of visit: 5/30/21  Reason for visit: Arm Pain  Current Status: better       He had multiple abscesses in his left axillary area, seen in ER and were drained. He says he is feeling better, no pain in the arm.    Review of Systems   Constitutional, HEENT, cardiovascular, pulmonary, gi and gu systems are negative, except as otherwise noted.      Objective    /74   Pulse 91   Temp 99.2  F (37.3  C) (Oral)   Wt 72.6 kg (160 lb)   SpO2 97%   BMI 21.85 kg/m    Body mass index is 21.85 kg/m .  Physical Exam   GENERAL: healthy, alert and no distress  RESP: lungs clear to auscultation - no rales, rhonchi or wheezes  CV: regular rate and rhythm, no murmur, click or rub, no peripheral edema   MS: Lt Axilla    Incision sites healing well, no drainage, redness or tenderness.

## 2021-06-08 ENCOUNTER — OFFICE VISIT (OUTPATIENT)
Dept: FAMILY MEDICINE | Facility: CLINIC | Age: 31
End: 2021-06-08
Attending: EMERGENCY MEDICINE
Payer: COMMERCIAL

## 2021-06-08 VITALS
SYSTOLIC BLOOD PRESSURE: 128 MMHG | DIASTOLIC BLOOD PRESSURE: 74 MMHG | TEMPERATURE: 99.2 F | WEIGHT: 160 LBS | OXYGEN SATURATION: 97 % | BODY MASS INDEX: 21.85 KG/M2 | HEART RATE: 91 BPM

## 2021-06-08 DIAGNOSIS — L02.419 AXILLARY ABSCESS: ICD-10-CM

## 2021-06-08 PROCEDURE — 99213 OFFICE O/P EST LOW 20 MIN: CPT | Performed by: FAMILY MEDICINE

## 2021-06-08 NOTE — PATIENT INSTRUCTIONS
Patient Education     Understanding Hidradenitis Suppurativa  Hidradenitis suppurativa is a long-term (chronic) skin disease. It causes painful bumps and sores (abscesses) to form around a hair follicle. Follicles are the tiny holes from which hair grows out of your skin. The disease occurs on parts of the body where skin rubs together. It most often appears in the armpits, the groin area, and under the breasts. It's more common in women.    How to say it  [id number]  What causes hidradenitis suppurativa?  This skin disease happens when hair follicles become clogged with keratin. Keratin is the protein that makes up your hair and nails. The follicles then burst and become inflamed . Pressure or rubbing on the skin can clog the follicles. Or it can further irritate them.   The disease tends to run in families. It s also more likely to occur in people who are obese, have diabetes, or smoke. Hormones or the immune system may also play a part.   Symptoms of hidradenitis suppurativa  This skin disease causes one or more painful red bumps on the skin. These bumps become inflamed and drain pus. They may also itch or burn. In severe cases, sinus tracts may form. These are narrow channels that run under the skin. Blood or a bad-smelling pus may ooze from these bumps or sinus tracts. Bands of scarring often occur.   Treatment for hidradenitis suppurativa  Treatment for this skin disease is most successful when started early. But it may be hard to diagnose. It may be mistaken for other skin conditions. The painful bumps also often return. So stopping new bumps and limiting scarring is important. Treatment options include:     Warm compress. Putting a warm, wet washcloth on the affected skin may help.    Lifestyle changes. Your symptoms may get better if you lose weight or stop smoking, if needed. Also avoid shaving or other irritants, such as deodorant or perfume.    Antibiotics. For mild cases, an  antibiotic for the skin (topical) may help. You may need oral antibiotics if you have a severe case. They can help prevent further infection.    Other oral medicines. Over-the-counter pain medicines can ease pain and inflammation. You may need stronger medicines for a severe case. These medicines include corticosteroids or a retinoid. These may cause side effects.    Injected medicines. A steroid may be injected into the bump to ease pain. A newer biologic medicine may be injected to ease severe symptoms.    Surgery. Surgery can drain and remove the painful bumps. For severe cases, the doctor may cut out the entire area of affected skin or destroy it with a laser.  Possible complications of hidradenitis suppurativa   These include:    Arthritis    Depression    Lymphedema    Scarring of skin    Skin cancer  When to call your healthcare provider  Call your healthcare provider right away if you have any of these:    Fever of 100.4 F (38 C) or higher, or as directed by your healthcare provider    Redness, swelling, or fluid leaking from your rash that gets worse    Pain that gets worse    Symptoms that don t get better, or get worse    New symptoms  Rhonda last reviewed this educational content on 6/1/2019 2000-2021 [employer]. All rights reserved. This information is not intended as a substitute for professional medical care. Always follow your healthcare professional's instructions.

## 2021-07-26 ENCOUNTER — OFFICE VISIT (OUTPATIENT)
Dept: FAMILY MEDICINE | Facility: CLINIC | Age: 31
End: 2021-07-26
Payer: COMMERCIAL

## 2021-07-26 VITALS
HEART RATE: 71 BPM | DIASTOLIC BLOOD PRESSURE: 83 MMHG | RESPIRATION RATE: 16 BRPM | OXYGEN SATURATION: 97 % | SYSTOLIC BLOOD PRESSURE: 142 MMHG | TEMPERATURE: 98.2 F

## 2021-07-26 DIAGNOSIS — L02.11 ABSCESS OF SKIN OF NECK: Primary | ICD-10-CM

## 2021-07-26 PROCEDURE — 99213 OFFICE O/P EST LOW 20 MIN: CPT

## 2021-07-26 RX ORDER — CEPHALEXIN 500 MG/1
500 CAPSULE ORAL 3 TIMES DAILY
Qty: 21 CAPSULE | Refills: 0 | Status: SHIPPED | OUTPATIENT
Start: 2021-07-26 | End: 2021-08-02

## 2021-07-26 RX ORDER — OMEGA-3 FATTY ACIDS/FISH OIL 300-1000MG
200 CAPSULE ORAL EVERY 4 HOURS PRN
Qty: 90 CAPSULE | Refills: 0 | Status: SHIPPED | OUTPATIENT
Start: 2021-07-26 | End: 2021-08-25

## 2021-07-26 NOTE — PROGRESS NOTES
Assessment & Plan     Abscess of skin of neck    - cephALEXin (KEFLEX) 500 MG capsule  Dispense: 21 capsule; Refill: 0  - ibuprofen (ADVIL/MOTRIN) 200 MG capsule  Dispense: 90 capsule; Refill: 0     Alternate Tylenol and Ibuprofen for pain. Apply moist warm wash cloth to area 3 times a day to promote drainage. Take antibiotic as directed with probiotics or yogurt    Return in about 1 week (around 8/2/2021), or if symptoms worsen or fail to improve.      Bette Massey is a 31 year old male who presents to clinic today for the following health issues:  Chief Complaint   Patient presents with     Derm Problem     Abscess on neck x 1 week     Patient presents to urgent care with abscess on your left neck a week, attempted to cut it open with razor blade x 2. He reports just blood came out, no drainage that he noted. It has gotten more painful. He denies head aches or fevers.         Review of Systems        Objective    BP (!) 142/83 (BP Location: Right arm, Patient Position: Sitting, Cuff Size: Adult Regular)   Pulse 71   Temp 98.2  F (36.8  C)   Resp 16   SpO2 97%   Physical Exam  Constitutional:       Appearance: Normal appearance. He is normal weight.   Cardiovascular:      Rate and Rhythm: Normal rate and regular rhythm.      Heart sounds: Normal heart sounds.   Pulmonary:      Effort: Pulmonary effort is normal.      Breath sounds: Normal breath sounds.   Musculoskeletal:      Cervical back: Normal range of motion and neck supple.   Skin:     General: Skin is warm.      Findings: Abscess and lesion present.             Comments: Erythematous abscess of neck, scabbing, tender to touch.    Neurological:      Mental Status: He is alert.              Taqueria Sanders PA-C

## 2021-07-26 NOTE — PATIENT INSTRUCTIONS
Alternate Tylenol and Ibuprofen for pain. Apply moist warm wash cloth to area 3 times a day to promote drainage. Take antibiotic as directed with probiotics or yogurt    Patient Education     Abscess (Antibiotic Treatment Only)  An abscess happens when bacteria get trapped under the skin and start to grow. Pus forms inside the abscess as the body responds to the bacteria. An abscess can happen with an insect bite, ingrown hair, blocked oil gland, pimple, cyst, or puncture wound. It is sometimes call a boil.   In the early stages, your wound may be red and tender. For this stage, you may get antibiotics. If the abscess does not get better with antibiotics, it will need to be drained with a small cut.   Home care  These tips will help you care for your abscess at home:    Soak the wound in hot water or apply hot packs (small towel soaked in hot water) to the area for 20 minutes at a time. Do this 3 to 4 times a day, or as instructed. Use a new towel each time. Wash the towels afterward because they may be contaminated with bacteria after use.    Don't cut, squeeze, or pop the boil yourself.    Put antibiotic cream or ointment on the skin 3 to 4 times a day, unless something else was prescribed. Some ointments include an antibiotic plus a pain reliever.    If your healthcare provider prescribed antibiotics, don't stop taking them until you have finished the medicine or you are told to stop.    You may use an over-the-counter pain medicine to control pain, unless another pain medicine was prescribed. Talk with your provider before taking these medicines if you have chronic liver or kidney disease or ever had a stomach ulcer or digestive bleeding.  Follow-up care  Follow up with your healthcare provider, or as advised. Check your wound each day for the signs that the infection may be getting worse (see below).   When to seek medical advice  Get prompt medical attention if any of these occur:    An increase in redness or  swelling    Red streaks in the skin leading away from the abscess    An increase in local pain or swelling    Fever of 100.4 F (38 C) or higher, or as directed by your healthcare provider    Pus or fluid coming from the abscess    Boil returns after getting better  Rhonda last reviewed this educational content on 8/1/2019 2000-2021 [employer]. All rights reserved. This information is not intended as a substitute for professional medical care. Always follow your healthcare professional's instructions.

## 2022-04-20 NOTE — ED TRIAGE NOTES
Pt c/o swollen lymph nodes under his right arm for 5 days, worse this morning.  Did get his 1st covid shot 2 days ago.  States lymph nodes were swollen prior to shot.   normal